# Patient Record
Sex: MALE | Race: WHITE | NOT HISPANIC OR LATINO | Employment: OTHER | URBAN - METROPOLITAN AREA
[De-identification: names, ages, dates, MRNs, and addresses within clinical notes are randomized per-mention and may not be internally consistent; named-entity substitution may affect disease eponyms.]

---

## 2017-03-21 ENCOUNTER — TELEPHONE (OUTPATIENT)
Dept: ANESTHESIOLOGY | Facility: CLINIC | Age: 57
End: 2017-03-21

## 2017-03-21 NOTE — TELEPHONE ENCOUNTER
Our clinic received a CD of the patient's thoracic lumbar imaging. He is not a current or future patient of ours. I called Baldemar to inquire if this was a mistake. He states he is not a patient here and was not referred here; is not sure why it was sent here.     I let him know I will send the CD to his house. Address was confirmed with him.    Sent to:   [id number] [facility] [id number]

## 2023-06-14 ENCOUNTER — HOSPITAL ENCOUNTER (EMERGENCY)
Facility: CLINIC | Age: 63
Discharge: HOME | End: 2023-06-14
Attending: EMERGENCY MEDICINE | Admitting: EMERGENCY MEDICINE
Payer: COMMERCIAL

## 2023-06-14 ENCOUNTER — APPOINTMENT (OUTPATIENT)
Dept: GENERAL RADIOLOGY | Facility: CLINIC | Age: 63
End: 2023-06-14
Attending: EMERGENCY MEDICINE
Payer: COMMERCIAL

## 2023-06-14 VITALS
RESPIRATION RATE: 15 BRPM | DIASTOLIC BLOOD PRESSURE: 99 MMHG | OXYGEN SATURATION: 89 % | TEMPERATURE: 98.5 F | BODY MASS INDEX: 29.52 KG/M2 | SYSTOLIC BLOOD PRESSURE: 145 MMHG | HEART RATE: 84 BPM | WEIGHT: 250 LBS | HEIGHT: 77 IN

## 2023-06-14 DIAGNOSIS — J44.9 COPD WITH HYPOXIA (H): ICD-10-CM

## 2023-06-14 LAB
ALBUMIN SERPL BCG-MCNC: 3.3 G/DL (ref 3.5–5.2)
ALP SERPL-CCNC: 36 U/L (ref 40–129)
ALT SERPL W P-5'-P-CCNC: 15 U/L (ref 0–70)
ANION GAP SERPL CALCULATED.3IONS-SCNC: 9 MMOL/L (ref 7–15)
AST SERPL W P-5'-P-CCNC: 19 U/L (ref 0–45)
ATRIAL RATE - MUSE: 79 BPM
BASOPHILS # BLD AUTO: 0 10E3/UL (ref 0–0.2)
BASOPHILS NFR BLD AUTO: 1 %
BILIRUB SERPL-MCNC: 0.4 MG/DL
BUN SERPL-MCNC: 10.2 MG/DL (ref 8–23)
CALCIUM SERPL-MCNC: 8.5 MG/DL (ref 8.8–10.2)
CHLORIDE SERPL-SCNC: 98 MMOL/L (ref 98–107)
CREAT SERPL-MCNC: 1.41 MG/DL (ref 0.67–1.17)
D DIMER PPP FEU-MCNC: 0.45 UG/ML FEU (ref 0–0.5)
DEPRECATED HCO3 PLAS-SCNC: 31 MMOL/L (ref 22–29)
DIASTOLIC BLOOD PRESSURE - MUSE: NORMAL MMHG
EOSINOPHIL # BLD AUTO: 0.1 10E3/UL (ref 0–0.7)
EOSINOPHIL NFR BLD AUTO: 1 %
ERYTHROCYTE [DISTWIDTH] IN BLOOD BY AUTOMATED COUNT: 15.9 % (ref 10–15)
GFR SERPL CREATININE-BSD FRML MDRD: 56 ML/MIN/1.73M2
GLUCOSE SERPL-MCNC: 103 MG/DL (ref 70–99)
HCT VFR BLD AUTO: 49.2 % (ref 40–53)
HGB BLD-MCNC: 15.5 G/DL (ref 13.3–17.7)
HOLD SPECIMEN: NORMAL
IMM GRANULOCYTES # BLD: 0 10E3/UL
IMM GRANULOCYTES NFR BLD: 0 %
INTERPRETATION ECG - MUSE: NORMAL
LYMPHOCYTES # BLD AUTO: 1.5 10E3/UL (ref 0.8–5.3)
LYMPHOCYTES NFR BLD AUTO: 20 %
MCH RBC QN AUTO: 28.7 PG (ref 26.5–33)
MCHC RBC AUTO-ENTMCNC: 31.5 G/DL (ref 31.5–36.5)
MCV RBC AUTO: 91 FL (ref 78–100)
MONOCYTES # BLD AUTO: 0.7 10E3/UL (ref 0–1.3)
MONOCYTES NFR BLD AUTO: 9 %
NEUTROPHILS # BLD AUTO: 5 10E3/UL (ref 1.6–8.3)
NEUTROPHILS NFR BLD AUTO: 69 %
NRBC # BLD AUTO: 0 10E3/UL
NRBC BLD AUTO-RTO: 0 /100
NT-PROBNP SERPL-MCNC: 556 PG/ML (ref 0–900)
P AXIS - MUSE: 51 DEGREES
PLATELET # BLD AUTO: 280 10E3/UL (ref 150–450)
POTASSIUM SERPL-SCNC: 3.6 MMOL/L (ref 3.4–5.3)
PR INTERVAL - MUSE: 174 MS
PROT SERPL-MCNC: 7.1 G/DL (ref 6.4–8.3)
QRS DURATION - MUSE: 94 MS
QT - MUSE: 402 MS
QTC - MUSE: 460 MS
R AXIS - MUSE: 43 DEGREES
RBC # BLD AUTO: 5.41 10E6/UL (ref 4.4–5.9)
SODIUM SERPL-SCNC: 138 MMOL/L (ref 136–145)
SYSTOLIC BLOOD PRESSURE - MUSE: NORMAL MMHG
T AXIS - MUSE: 18 DEGREES
TROPONIN T SERPL HS-MCNC: 25 NG/L
VENTRICULAR RATE- MUSE: 79 BPM
WBC # BLD AUTO: 7.3 10E3/UL (ref 4–11)

## 2023-06-14 PROCEDURE — 93005 ELECTROCARDIOGRAM TRACING: CPT

## 2023-06-14 PROCEDURE — 84484 ASSAY OF TROPONIN QUANT: CPT | Performed by: EMERGENCY MEDICINE

## 2023-06-14 PROCEDURE — 36415 COLL VENOUS BLD VENIPUNCTURE: CPT | Performed by: EMERGENCY MEDICINE

## 2023-06-14 PROCEDURE — 83880 ASSAY OF NATRIURETIC PEPTIDE: CPT | Performed by: EMERGENCY MEDICINE

## 2023-06-14 PROCEDURE — 85379 FIBRIN DEGRADATION QUANT: CPT | Performed by: EMERGENCY MEDICINE

## 2023-06-14 PROCEDURE — 71046 X-RAY EXAM CHEST 2 VIEWS: CPT

## 2023-06-14 PROCEDURE — 80053 COMPREHEN METABOLIC PANEL: CPT | Performed by: EMERGENCY MEDICINE

## 2023-06-14 PROCEDURE — 85025 COMPLETE CBC W/AUTO DIFF WBC: CPT | Performed by: EMERGENCY MEDICINE

## 2023-06-14 PROCEDURE — 99285 EMERGENCY DEPT VISIT HI MDM: CPT | Mod: 25

## 2023-06-14 ASSESSMENT — ACTIVITIES OF DAILY LIVING (ADL): ADLS_ACUITY_SCORE: 35

## 2023-06-14 NOTE — ED PROVIDER NOTES
History     Chief Complaint:  Shortness of Breath and hypoxia       The history is provided by the patient.      Baldemar Dela Cruz is a 62 year old male with a history of polysubstance abuse who presents with shortness of breath and hypoxia. The patient was being seen at his pain clinic today where he was found to have low oxygen saturation, prompting presentation to the ED. The patient reports that he has experienced worsening shortness of breath for the past few weeks. He states that his baseline oxygen saturation is in the high 80s. He reports a history of smoking but states that he has quit within the past few years. He denies experiencing cough, sinus congestion, sore throat, fever, chest pain, new leg swelling, or weight changes. He notes that he has a known left-sided lower extremity arterial clot. He notes that he has been unable to sleep laying flat in a bed due to back pain. He has a pain pump. He states that he sees a primary care provider regularly. He presents to the ED with his sister.    Independent Historian:   None - Patient Only    Review of External Notes:   I reviewed the patient's clinical summary from [facility].     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   Pituitary dysfunction  Polysubstance abuse  Sleep apnea   Tobacco use disorder   Toe ulcer, left, with necrosis of  "muscle   Uncomplicated opioid dependence   Violation of pain agreement & contract  Vitamin D deficiency    Past Surgical History:    Amputation, toe, fourth, left  Colonoscopy  Epidural steroid injection, C6-7  Flexor tenotomy toe, fourth, right  Trigger point injection, bilateral, neck and shoulder    Physical Exam     Patient Vitals for the past 24 hrs:   BP Temp Temp src Pulse Resp SpO2 Height Weight   06/14/23 1515 (!) 145/99 -- -- -- -- (!) 89 % -- --   06/14/23 1400 -- -- -- 84 15 92 % -- --   06/14/23 1328 (!) 151/83 98.5  F (36.9  C) Temporal 84 18 90 % 1.956 m (6' 5\") 113.4 kg (250 lb)        Physical Exam  Eye:  Pupils are equal, round, and reactive.  Extraocular movements intact.    ENT:  No rhinorrhea.  Moist mucus membranes.  Normal tongue and tonsil.    Cardiac:  Regular rate and rhythm.  No murmurs, gallops, or rubs.    Pulmonary:  Diminished breath sounds throughout with few scattered wheezes.    Abdomen:  Positive bowel sounds.  Abdomen is soft and non-distended, without focal tenderness.    Musculoskeletal:  3+ pitting edema bilaterally and symmetrically. Normal movement of all extremities without evidence for deficit.    Skin:  Warm and dry without rashes.    Neurologic:  Non-focal exam without asymmetric weakness or numbness.     Psychiatric:  Normal affect with appropriate interaction with examiner.    Emergency Department Course   ECG  ECG taken at 1349, ECG read at 1355  Normal sinus rhythm.   Rate 79 bpm. SC interval 174 ms. QRS duration 94 ms. QT/QTc 402/460 ms. P-R-T axes 51 43 18.     Imaging:  Chest XR,  PA & LAT   Final Result   IMPRESSION: Normal size of cardiomediastinal silhouette. Subtle right   basilar opacity, could represent atelectasis or developing airspace   consolidation. No definite pleural effusion or pneumothorax. No acute   bony abnormality. Posterior cervical fusion hardware partially   visualized.      DEANDRE WHITFIELD MD            SYSTEM ID:  [id number]         Report " per radiology    Laboratory:  Labs Ordered and Resulted from Time of ED Arrival to Time of ED Departure   COMPREHENSIVE METABOLIC PANEL - Abnormal       Result Value    Sodium 138      Potassium 3.6      Chloride 98      Carbon Dioxide (CO2) 31 (*)     Anion Gap 9      Urea Nitrogen 10.2      Creatinine 1.41 (*)     Calcium 8.5 (*)     Glucose 103 (*)     Alkaline Phosphatase 36 (*)     AST 19      ALT 15      Protein Total 7.1      Albumin 3.3 (*)     Bilirubin Total 0.4      GFR Estimate 56 (*)    TROPONIN T, HIGH SENSITIVITY - Abnormal    Troponin T, High Sensitivity 25 (*)    CBC WITH PLATELETS AND DIFFERENTIAL - Abnormal    WBC Count 7.3      RBC Count 5.41      Hemoglobin 15.5      Hematocrit 49.2      MCV 91      MCH 28.7      MCHC 31.5      RDW 15.9 (*)     Platelet Count 280      % Neutrophils 69      % Lymphocytes 20      % Monocytes 9      % Eosinophils 1      % Basophils 1      % Immature Granulocytes 0      NRBCs per 100 WBC 0      Absolute Neutrophils 5.0      Absolute Lymphocytes 1.5      Absolute Monocytes 0.7      Absolute Eosinophils 0.1      Absolute Basophils 0.0      Absolute Immature Granulocytes 0.0      Absolute NRBCs 0.0     NT PROBNP INPATIENT - Normal    N terminal Pro BNP Inpatient 556     D DIMER QUANTITATIVE - Normal    D-Dimer Quantitative 0.45          Emergency Department Course & Assessments:    Interventions:  Medications - No data to display     Assessments:  1406 I presented to ED room #14 and obtained history from the patient and examined him as noted above.  1519 I spoke again with the patient after the patient's tech reported the results of his road test. He would like to go home.    Independent Interpretation (X-rays, CTs, rhythm strip):  Imaging independently reviewed and agree with radiologist interpretation.     Consultations/Discussion of Management or Tests:  None     Social Determinants of Health affecting care:   None    Disposition:  The patient was discharged to  "home.     Impression & Plan      Medical Decision Making:  This very pleasant 62-year-old presents to us from his pain clinic because of concerns for hypoxia.  The patient states that he has advanced COPD and has been told that his baseline oxygenation sits between 87 and 90%.  He does note that he feels more short of breath when he ambulates or does the dishes.  However, he notes that he has been dealing with this \"for quite some time.\"  When he presented to the [facility] today to have his pain pump refilled, they noted that his oxygen levels were as low as 84% after he walked in from the parking lot and they became rather concerned, urging him to come here for assessment.  However, at the time of my evaluation, the patient has no complaints, denying chest pain, shortness of breath, new leg swelling, weight gain, etc.    On my exam, he has diminished breath sounds bilaterally.  Eitel signs are reviewed showing a baseline O2 sat of 89%.  Blood work does show evidence of an elevated bicarbonate level, consistent with probable chronic hypercapnia.  Otherwise, his hemoglobin is normal.  His white blood cell count is normal.  Chest x-ray is reassuring.  He has no new cough, nasal congestion, sore throat, fever, or other infectious findings.  He is low risk for pulmonary embolism, and with a negative D-dimer, this is sufficient to rule him out for PE.    At this juncture, I spoke with the patient and his sister at length of how to proceed.  I explained that he does meet criteria for admission to the hospital.  He could be set up for home oxygen, something that I believe he likely would benefit from on a chronic basis.  The patient is adamant that he does not want to be admitted here.  He would like to return home to [place].  Considering that these issues are all chronic, I do not feel this is completely unreasonable.  I believe he has full capacity to make these decisions and his sister is in support of " them as well.  I strongly urged him to see his primary team to get coverage for supplemental oxygen.  We also discussed purchasing a concentrator.  Otherwise, he knows that he can return to the local emergency department at any point if he has any worsening of his condition or if there are other emergent concerns.    Diagnosis:    ICD-10-CM    1. COPD with hypoxia (H)  J44.9     R09.02            Discharge Medications:  There are no discharge medications for this patient.       Scribe Disclosure:  Aurea BROWNING B.S. am serving as a scribe at 1:47 PM on 6/14/2023 to document services personally performed by Trierweiler, Chad A, MD based on my observations and the provider's statements to me.        Trierweiler, Chad A, MD  06/14/23 7811

## 2023-06-14 NOTE — DISCHARGE INSTRUCTIONS
As discussed, it is important you follow-up with your primary care team to discuss your chronic hypoxia.  You may benefit from supplemental oxygen.  Otherwise, do not hesitate to return to the local emergency department if your breathing becomes more labored, you experience chest pain, lightheadedness, or any other emergent concerns.